# Patient Record
Sex: FEMALE | Race: WHITE
[De-identification: names, ages, dates, MRNs, and addresses within clinical notes are randomized per-mention and may not be internally consistent; named-entity substitution may affect disease eponyms.]

---

## 2020-11-20 ENCOUNTER — HOSPITAL ENCOUNTER (OUTPATIENT)
Dept: HOSPITAL 75 - RAD | Age: 40
End: 2020-11-20
Attending: NURSE PRACTITIONER
Payer: COMMERCIAL

## 2020-11-20 DIAGNOSIS — N93.9: Primary | ICD-10-CM

## 2020-11-20 PROCEDURE — 76856 US EXAM PELVIC COMPLETE: CPT

## 2020-11-20 PROCEDURE — 76830 TRANSVAGINAL US NON-OB: CPT

## 2020-11-20 NOTE — DIAGNOSTIC IMAGING REPORT
PROCEDURE: 

Pelvic comp/transvaginal sonogram.



TECHNIQUE: 

Complete transabdominal and transvaginal pelvic ultrasound was

performed. In addition, limited pelvic Doppler was performed.



INDICATION: Dysfunctional uterine bleeding.



FINDINGS: The uterus is anteverted measuring 9.7 x 5.0 x 6.8 cm.

No myometrial mass is detected. Endometrium is 9 mm in thickness.

Right ovary measures 4.7 x 2.9 x 4.0 cm and the left ovary

measures 2.9 x 1.6 x 2.7 cm. There is a complex hypoechoic mass

involving the right ovary measuring 2.4 x 3.7 cm. This has the

appearance of a hemorrhagic cyst. There is blood flow to both

ovaries. No free fluid is detected.



IMPRESSION: Probable hemorrhagic cyst in the right ovary

measuring 2.4 x 3.7 cm. Follow-up in two to three months could be

performed to confirm resolution. The study is otherwise

unremarkable.



Dictated by: 



  Dictated on workstation # [id number]

## 2022-01-05 ENCOUNTER — HOSPITAL ENCOUNTER (OUTPATIENT)
Dept: HOSPITAL 75 - RAD | Age: 42
End: 2022-01-05
Attending: SURGERY
Payer: COMMERCIAL

## 2022-01-05 DIAGNOSIS — R10.11: Primary | ICD-10-CM

## 2022-01-05 PROCEDURE — 76705 ECHO EXAM OF ABDOMEN: CPT

## 2022-01-05 NOTE — DIAGNOSTIC IMAGING REPORT
INDICATION: 

Right upper quadrant abdominal pain.



TECHNIQUE: 

Gallbladder sonography was performed in the routine fashion.



FINDINGS:

The liver shows normal echogenicity with no focal lesions. The

gallbladder is unremarkable with no stones or wall thickening.

The common duct measures 2 mm. The pancreas is unremarkable. The

visualized portions of the aorta and IVC are normal. The right

kidney measures 12.1 cm in length. There is no ascites. Davila's

sign was negative. Portal vein is patent with hepatopetal flow.



IMPRESSION:

Unremarkable ultrasound of the right upper quadrant.



Dictated by: 



  Dictated on workstation # [id number]

## 2022-01-10 ENCOUNTER — HOSPITAL ENCOUNTER (OUTPATIENT)
Dept: HOSPITAL 75 - CARD | Age: 42
End: 2022-01-10
Attending: SURGERY
Payer: COMMERCIAL

## 2022-01-10 DIAGNOSIS — R10.11: Primary | ICD-10-CM

## 2022-01-10 PROCEDURE — 78227 HEPATOBIL SYST IMAGE W/DRUG: CPT

## 2022-01-10 NOTE — DIAGNOSTIC IMAGING REPORT
INDICATION: 

Right upper quadrant pain.



TECHNIQUE: 

The patient was administered 5.0 mCi of technetium 99m Choletec

and imaging over the abdomen was performed. At 1 hour, the

patient ingested 8 ounces of Ensure and the gallbladder ejection

fraction was calculated.



FINDINGS:

There is homogeneous uptake of activity by the liver with prompt

excretion of activity into the gallbladder and common duct. There

is passage of activity into the small bowel. There does appear to

be a small amount of gastric activity, suggestive of bile reflux.

The gallbladder ejection fraction is normal at 44%.



IMPRESSION:

1. Patent cystic duct and common bile duct.

2. Normal gallbladder ejection fraction of 44%.

3. Mild gastric uptake suggestive of bile reflux.



Dictated by: 



  Dictated on workstation # [id number]